# Patient Record
Sex: FEMALE | Race: WHITE | URBAN - METROPOLITAN AREA
[De-identification: names, ages, dates, MRNs, and addresses within clinical notes are randomized per-mention and may not be internally consistent; named-entity substitution may affect disease eponyms.]

---

## 2017-11-03 ENCOUNTER — TRANSFERRED RECORDS (OUTPATIENT)
Dept: HEALTH INFORMATION MANAGEMENT | Facility: CLINIC | Age: 27
End: 2017-11-03

## 2018-04-16 ENCOUNTER — CARE COORDINATION (OUTPATIENT)
Dept: CARDIOLOGY | Facility: CLINIC | Age: 28
End: 2018-04-16

## 2018-04-16 NOTE — PROGRESS NOTES
LM for patient to call back to request that she brings CD of all imaging done and bring to clinic visit with Dr. Jovanna Jolley.

## 2018-04-17 ENCOUNTER — CARE COORDINATION (OUTPATIENT)
Dept: CARDIOLOGY | Facility: CLINIC | Age: 28
End: 2018-04-17

## 2018-04-17 NOTE — PROGRESS NOTES
Patient called in stating they are in [place] right now. They asked regions to send over all imaging needed for appointment on 4/23 at the [facility]. They were also asking if care everywhere could be accessed for any additional imagining needed. They are tied up since they will be traveling the next couple days.

## 2018-04-23 ENCOUNTER — OFFICE VISIT (OUTPATIENT)
Dept: CARDIOLOGY | Facility: CLINIC | Age: 28
End: 2018-04-23
Attending: INTERNAL MEDICINE
Payer: COMMERCIAL

## 2018-04-23 VITALS
BODY MASS INDEX: 21.47 KG/M2 | HEART RATE: 107 BPM | HEIGHT: 70 IN | WEIGHT: 150 LBS | DIASTOLIC BLOOD PRESSURE: 85 MMHG | OXYGEN SATURATION: 99 % | SYSTOLIC BLOOD PRESSURE: 117 MMHG

## 2018-04-23 DIAGNOSIS — G90.A POTS (POSTURAL ORTHOSTATIC TACHYCARDIA SYNDROME): Primary | ICD-10-CM

## 2018-04-23 DIAGNOSIS — Z01.30 BLOOD PRESSURE CHECK: ICD-10-CM

## 2018-04-23 DIAGNOSIS — G43.009 MIGRAINE WITHOUT AURA AND WITHOUT STATUS MIGRAINOSUS, NOT INTRACTABLE: ICD-10-CM

## 2018-04-23 PROCEDURE — 93005 ELECTROCARDIOGRAM TRACING: CPT | Mod: ZF

## 2018-04-23 PROCEDURE — 99204 OFFICE O/P NEW MOD 45 MIN: CPT | Mod: ZP | Performed by: INTERNAL MEDICINE

## 2018-04-23 PROCEDURE — 93010 ELECTROCARDIOGRAM REPORT: CPT | Mod: ZP | Performed by: INTERNAL MEDICINE

## 2018-04-23 PROCEDURE — G0463 HOSPITAL OUTPT CLINIC VISIT: HCPCS | Mod: 25,ZF

## 2018-04-23 RX ORDER — PROPRANOLOL HYDROCHLORIDE 10 MG/1
TABLET ORAL
COMMUNITY
Start: 2017-10-05 | End: 2018-06-26 | Stop reason: DRUGHIGH

## 2018-04-23 RX ORDER — IBUPROFEN 400 MG/1
400 TABLET, FILM COATED ORAL
COMMUNITY

## 2018-04-23 RX ORDER — CLONIDINE HYDROCHLORIDE 0.2 MG/1
0.2 TABLET ORAL
COMMUNITY
Start: 2017-02-03

## 2018-04-23 RX ORDER — CLONAZEPAM 0.5 MG/1
0.5 TABLET ORAL
COMMUNITY
Start: 2017-11-08

## 2018-04-23 RX ORDER — CYANOCOBALAMIN (VITAMIN B-12) 2000 MCG
2500 TABLET ORAL
COMMUNITY

## 2018-04-23 RX ORDER — TRAZODONE HYDROCHLORIDE 100 MG/1
100 TABLET ORAL
COMMUNITY
Start: 2017-11-08

## 2018-04-23 RX ORDER — SERTRALINE HYDROCHLORIDE 25 MG/1
25 TABLET, FILM COATED ORAL
COMMUNITY
Start: 2018-03-13 | End: 2019-03-13

## 2018-04-23 RX ORDER — RIBOFLAVIN (VITAMIN B2) 100 MG
TABLET ORAL
COMMUNITY
Start: 2017-11-03

## 2018-04-23 ASSESSMENT — PAIN SCALES - GENERAL: PAINLEVEL: NO PAIN (0)

## 2018-04-23 NOTE — PATIENT INSTRUCTIONS
"You were seen today in the Cardiovascular Clinic at the [facility].     Cardiology Providers you saw during your visit: Dr. Jovanna Jolley    Diagnosis: Autonomic dysfunction    Results: discussed with patient    Orders: none    Medication Changes:   Can increase propranolol slowly to 10/20/10 every day if blood pressure is stable    Recommendations:   Stand up training against wall as directed  Aggressive hydration    Follow-up:  3 months         Please feel free to call me with any questions or concerns.       Anastacio Wagner LPN     Questions and [phone].   First press #1 for the [employer] and then press #3 for \"Medical Questions\" to reach us Cardiology Nurses.      On Call Cardiologist for after hours or on weekends: [phone]   option #4 and ask to speak to the on-call Cardiologist.          If you need a medication refill please contact your pharmacy.  Please allow 3 business days for your refill to be completed.        "
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
## 2018-04-23 NOTE — MR AVS SNAPSHOT
"              After Visit Summary   2018    Candy Duong    MRN: [id number]           Patient Information     Date Of Birth          1990        Visit Information        Provider Department      2018 10:00 AM Jovanna Jolley MD [facility]        Today's Diagnoses     Syncope    -  1    Blood pressure check        Migraine          Care Instructions    You were seen today in the Cardiovascular Clinic at the [facility].     Cardiology Providers you saw during your visit: Dr. Jovanna Jolley    Diagnosis: Autonomic dysfunction    Results: discussed with patient    Orders: none    Medication Changes:   Can increase propranolol slowly to 10/20/10 every day if blood pressure is stable    Recommendations:   Stand up training against wall as directed  Aggressive hydration    Follow-up:  3 months         Please feel free to call me with any questions or concerns.       Anastacio Wagner LPN     Questions and [phone].   First press #1 for the [employer] and then press #3 for \"Medical Questions\" to reach us Cardiology Nurses.      On Call Cardiologist for after hours or on weekends: [phone]   option #4 and ask to speak to the on-call Cardiologist.          If you need a medication refill please contact your pharmacy.  Please allow 3 business days for your refill to be completed.                Follow-ups after your visit        Additional Services     Neurology Referral       Your provider has referred you for the following:   Consult at PREFERRED PROVIDERS: Patient preference of choice    Please be aware that coverage of these services is subject to the terms and limitations of your health insurance plan.  Call member services at your health plan with any benefit or coverage questions.      Please bring the following with you to your appointment:    (1) Any X-Rays, CTs or MRIs which have been performed.  Contact the facility where they were done to arrange for  " "prior to your scheduled appointment.    (2) List of current medications  (3) This referral request   (4) Any documents/labs given to you for this referral            Follow-Up with Electrophysiologist - 3 Months                 Your next 10 appointments already scheduled     Jul 23, 2018 12:00 PM CDT   (Arrive by 11:45 AM)   RETURN ARRHYTHMIA with Jovanna Jolley MD   [facility]    [address] [place] [address]  [address] [place] [id number] [phone]              Future tests that were ordered for you today     Open Future Orders        Priority Expected Expires Ordered    Neurology Referral Routine 4/30/2018 7/29/2018 4/23/2018    Follow-Up with Electrophysiologist - 3 Months Routine 7/22/2018 10/20/2018 4/23/2018            Who to contact     If you have questions or need follow up information about today's clinic visit or your schedule please contact [facility] directly at [phone].  Normal or non-critical lab and imaging results will be communicated to you by [employer] mobilehart, letter or phone within 4 business days after the clinic has received the results. If you do not hear from us within 7 days, please contact the clinic through [employer] mobilehart or phone. If you have a critical or abnormal lab result, we will notify you by phone as soon as possible.  Submit refill requests through [employer] or call your pharmacy and they will forward the refill request to us. Please allow 3 business days for your refill to be completed.          Additional Information About Your Visit        [employer] Information     [employer] lets you send messages to your doctor, view your test results, renew your prescriptions, schedule appointments and more. To sign up, go to [email or web address] . Click on \"Log in\" on the left side of the screen, which will take you to the Welcome page. Then click on \"Sign up Now\" on the right side of the page.     You will be asked to enter the access code listed " "below, as well as some personal information. Please follow the directions to create your username and password.     Your access code is: [email or web address]  Expires: 2018 11:30 AM     Your access code will  in 90 days. If you need help or a new code, please call your [facility] clinic or [phone].        Care EveryWhere ID     This is your Care EveryWhere ID. This could be used by other organizations to access your [facility] medical records  [id number]        Your Vitals Were     Pulse Height Pulse Oximetry BMI (Body Mass Index)          107 1.778 m (5' 10\") 99% 21.52 kg/m2         Blood Pressure from Last 3 Encounters:   18 117/85    Weight from Last 3 Encounters:   18 68 kg (150 lb)              We Performed the Following     EKG 12-lead, tracing only (Same Day)     MISCELLANEOUS DME SUPPLY OR ACCESSORY, NOT OTHERWISE SPECIFIED        Primary Care Provider Office Phone # Fax #    Cassia Delgadillo [phone]       [place] [address]        Equal Access to Services     [facility] HospitalLEIGH : Hadii aad ku hadasho Soomaali, waaxda luqadaha, qaybta kaalmada adeegyada, toan tan . So [facility] [phone].    ATENCIÓN: Si habla español, tiene a thomson disposición servicios gratuitos de asistencia lingüística. Llame al [phone].    We comply with applicable federal civil rights laws and [place] laws. We do not discriminate on the basis of race, color, national origin, age, disability, sex, sexual orientation, or gender identity.            Thank you!     Thank you for choosing [facility]  for your care. Our goal is always to provide you with excellent care. Hearing back from our patients is one way we can continue to improve our services. Please take a few minutes to complete the written survey that you may receive in the mail after your visit with us. Thank you!             Your Updated Medication List - Protect " others around you: Learn how to safely use, store and throw away your medicines at www.disposemymeds.org.          This list is accurate as of 4/23/18 11:10 AM.  Always use your most recent med list.                   Brand Name Dispense Instructions for use Diagnosis    B-12 2000 MCG Tabs      Take 2,500 mcg by mouth        clonazePAM 0.5 MG tablet    klonoPIN     Take 0.5 mg by mouth        cloNIDine 0.2 MG tablet    CATAPRES     Take 0.2 mg by mouth        ibuprofen 400 MG tablet    ADVIL/MOTRIN     Take 400 mg by mouth        propranolol 10 MG tablet    INDERAL     TAKE 1 TABLET BY MOUTH THREE TIMES DAILY        riboflavin 100 MG Tabs tablet    vitamin  B-2     Take 2 pill twice a day        sertraline 25 MG tablet    ZOLOFT     Take 25 mg by mouth        traZODone 100 MG tablet    DESYREL     Take 100 mg by mouth

## 2018-04-23 NOTE — NURSING NOTE
Return Appointment: 3 months- Patient given instructions regarding scheduling next clinic visit. Patient demonstrated understanding of this information and agreed to call with further questions or concerns.  Medication Change: Can increase propranolol slowly to 10/20/10 every day if blood pressure is stable. Patient was educated regarding prescribed medication change, including discussion of the indication, administration, side effects, and when to report to MD or RN. Patient demonstrated understanding of this information and agreed to call with further questions or concerns.  Patient stated she understood all health information given and agreed to call with further questions or concerns.

## 2018-04-23 NOTE — NURSING NOTE
Chief Complaint   Patient presents with     Follow Up For     27 yo female present for syncope      Vitals were taken and medications were reconciled. EKG was performed.Orthostatic BP check   Whit Soriano,YONYA  9:51 AM

## 2018-04-25 LAB — INTERPRETATION ECG - MUSE: NORMAL

## 2018-05-13 ASSESSMENT — ENCOUNTER SYMPTOMS
TREMORS: 1
FATIGUE: 1
HYPOTENSION: 1
SMELL DISTURBANCE: 0
SINUS CONGESTION: 1
BLOATING: 0
SNORES LOUDLY: 0
SHORTNESS OF BREATH: 1
EXERCISE INTOLERANCE: 1
POLYDIPSIA: 0
VOMITING: 0
NIGHT SWEATS: 1
HEARTBURN: 0
DECREASED APPETITE: 1
LIGHT-HEADEDNESS: 1
BLOOD IN STOOL: 0
EYE IRRITATION: 0
SWOLLEN GLANDS: 0
HEMOPTYSIS: 0
HALLUCINATIONS: 0
BOWEL INCONTINENCE: 0
COUGH DISTURBING SLEEP: 0
SYNCOPE: 1
DEPRESSION: 1
LEG PAIN: 0
CHILLS: 1
DISTURBANCES IN COORDINATION: 0
POLYPHAGIA: 0
ORTHOPNEA: 0
TROUBLE SWALLOWING: 0
BRUISES/BLEEDS EASILY: 1
WEIGHT LOSS: 0
RECTAL PAIN: 0
EYE PAIN: 0
INCREASED ENERGY: 0
EYE REDNESS: 0
SORE THROAT: 0
ABDOMINAL PAIN: 0
NECK MASS: 0
NAUSEA: 0
SLEEP DISTURBANCES DUE TO BREATHING: 0
CONSTIPATION: 0
FEVER: 0
HOARSE VOICE: 0
POSTURAL DYSPNEA: 0
TASTE DISTURBANCE: 0
PARALYSIS: 0
LOSS OF CONSCIOUSNESS: 1
DECREASED CONCENTRATION: 0
DIARRHEA: 1
TINGLING: 1
JAUNDICE: 0
MEMORY LOSS: 1
WHEEZING: 0
PANIC: 1
DIZZINESS: 1
PALPITATIONS: 1
SEIZURES: 0
SPEECH CHANGE: 1
SINUS PAIN: 0
DYSPNEA ON EXERTION: 1
NUMBNESS: 0
BREAST PAIN: 1
INSOMNIA: 1
WEIGHT GAIN: 0
HYPERTENSION: 0
DOUBLE VISION: 1
HEADACHES: 1
BREAST MASS: 1
COUGH: 0
SPUTUM PRODUCTION: 0
WEAKNESS: 1
EYE WATERING: 1
ALTERED TEMPERATURE REGULATION: 0
NERVOUS/ANXIOUS: 1

## 2018-05-14 NOTE — TELEPHONE ENCOUNTER
FUTURE VISIT INFORMATION      FUTURE VISIT INFORMATION:    Date: 05/17/2018    Time:     Location:   REFERRAL INFORMATION:    Referring provider:  YESSENIA REYNOSO    Referring providers clinic:      Reason for visit/diagnosis        RECORDS STATUS    Internal Records:    Epic Chart and Care Everywhere, Images Epic chart and Care Everywhere, Additional Records in Scanned Images on the Encounters Tab.

## 2018-05-15 ENCOUNTER — HEALTH MAINTENANCE LETTER (OUTPATIENT)
Age: 28
End: 2018-05-15

## 2018-05-17 ENCOUNTER — OFFICE VISIT (OUTPATIENT)
Dept: NEUROLOGY | Facility: CLINIC | Age: 28
End: 2018-05-17
Payer: COMMERCIAL

## 2018-05-17 ENCOUNTER — PRE VISIT (OUTPATIENT)
Dept: NEUROLOGY | Facility: CLINIC | Age: 28
End: 2018-05-17

## 2018-05-17 VITALS
SYSTOLIC BLOOD PRESSURE: 112 MMHG | WEIGHT: 145.4 LBS | BODY MASS INDEX: 20.81 KG/M2 | HEART RATE: 82 BPM | HEIGHT: 70 IN | DIASTOLIC BLOOD PRESSURE: 79 MMHG

## 2018-05-17 DIAGNOSIS — R41.89 COGNITIVE AND BEHAVIORAL CHANGES: ICD-10-CM

## 2018-05-17 DIAGNOSIS — S06.9X9S TRAUMATIC BRAIN INJURY WITH LOSS OF CONSCIOUSNESS, SEQUELA (H): ICD-10-CM

## 2018-05-17 DIAGNOSIS — R46.89 COGNITIVE AND BEHAVIORAL CHANGES: ICD-10-CM

## 2018-05-17 DIAGNOSIS — G89.29 CHRONIC INTRACTABLE HEADACHE, UNSPECIFIED HEADACHE TYPE: Primary | ICD-10-CM

## 2018-05-17 DIAGNOSIS — G43.009 MIGRAINE WITHOUT AURA AND WITHOUT STATUS MIGRAINOSUS, NOT INTRACTABLE: ICD-10-CM

## 2018-05-17 DIAGNOSIS — R51.9 CHRONIC INTRACTABLE HEADACHE, UNSPECIFIED HEADACHE TYPE: Primary | ICD-10-CM

## 2018-05-17 RX ORDER — LORATADINE 10 MG/1
10 TABLET ORAL DAILY
COMMUNITY

## 2018-05-17 ASSESSMENT — PAIN SCALES - GENERAL: PAINLEVEL: NO PAIN (0)
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
## 2018-05-17 NOTE — MR AVS SNAPSHOT
After Visit Summary   5/17/2018    Candy Duong    MRN: [id number]           Patient Information     Date Of Birth          1990        Visit Information        Provider Department      5/17/2018 5:00 PM Mercedez Alvarez MD [facility] Neurology        Today's Diagnoses     Chronic intractable headache, unspecified headache type    -  1    Migraine without aura and without status migrainosus, not intractable        Traumatic brain injury with loss of consciousness, sequela (H)        Cognitive and behavioral changes           Follow-ups after your visit        Additional Services     NEUROPSYCHOLOGY REFERRAL       Your provider has referred you to:    Dr Zahra Brock [place]    All scheduling is subject to the client's specific insurance plan & benefits, provider/location availability, and provider clinical specialities.  Please arrive 15 minutes early for your first appointment and bring your completed paperwork.    Please be aware that coverage of these services is subject to the terms and limitations of your health insurance plan.  Call member services at your health plan with any benefit or coverage questions.    Please bring the following to your appointment:  >>   Any x-rays, CTs or MRIs which have been performed.  Contact the facility where they were done to arrange for  prior to your scheduled appointment.  Any new CT, MRI or other procedures ordered by your specialist must be performed at a [facility] facility or coordinated by your clinic's referral office.    >>   List of current medications   >>   This referral request   >>   Any documents/labs given to you for this referral                  Follow-up notes from your care team     Return for Routine Visit.      Your next 10 appointments already scheduled     May 30, 2018 12:30 PM CDT   MR VENOGRAM BRAIN W/O CONTRAST ANGIOGRAM with URMR2   [facility], MRI ([facility]  [place])    [address] [facility] [address] [phone]           Take your medicines as usual, unless your doctor tells you not to. Bring a list of your current medicines to your exam (including vitamins, minerals and over-the-counter drugs). Also bring the results of similar scans you may have had.  Please remove any body piercings and hair extensions before you arrive.  Follow your doctor s orders. If you do not, we may have to postpone your exam.  You may or may not receive IV contrast for this exam pending the discretion of the Radiologist.  You do not need to do anything special to prepare.  The MRI machine uses a strong magnet. Please wear clothes without metal (snaps, zippers). A sweatsuit works well, or we may give you a hospital gown.   **IMPORTANT** THE INSTRUCTIONS BELOW ARE ONLY FOR THOSE PATIENTS WHO HAVE BEEN PRESCRIBED SEDATION OR GENERAL ANESTHESIA DURING THEIR MRI PROCEDURE:  IF YOUR DOCTOR PRESCRIBED ORAL SEDATION (take medicine to help you relax during your exam):   You must get the medicine from your doctor (oral medication) before you arrive. Bring the medicine to the exam. Do not take it at home. You ll be told when to take it upon arriving for your exam.   Arrive one hour early. Bring someone who can take you home after the test. Your medicine will make you sleepy. After the exam, you may not drive, take a bus or take a taxi by yourself.  IF YOUR DOCTOR PRESCRIBED IV SEDATION:   Arrive one hour early. Bring someone who can take you home after the test. Your medicine will make you sleepy. After the exam, you may not drive, take a bus or take a taxi by yourself.   No eating 6 hours before your exam. You may have clear liquids up until 4 hours before your exam. (Clear liquids include water, clear tea, black coffee and fruit juice without pulp.)  IF YOUR DOCTOR PRESCRIBED ANESTHESIA (be asleep for your exam):   Arrive 1 1/2 hours early. Bring someone who can take you home after  the test. You may not drive, take a bus or take a taxi by yourself.   No eating 8 hours before your exam. You may have clear liquids up until 4 hours before your exam. (Clear liquids include water, clear tea, black coffee and fruit juice without pulp.)   You will spend four to five hours in the recovery room.  Please call the Imaging Department at your exam site with any questions.            May 30, 2018 12:45 PM CDT   MR NECK W CONTRAST ANGIOGRAM with URMR2   [facility] [place], MRI ([facility])    [facility] [facility] [address] [phone]           Take your medicines as usual, unless your doctor tells you not to. Bring a list of your current medicines to your exam (including vitamins, minerals and over-the-counter drugs).  You may or may not receive intravenous (IV) contrast for this exam pending the discretion of the Radiologist.  You do not need to do anything special to prepare.  The MRI machine uses a strong magnet. Please wear clothes without metal (snaps, zippers). A sweatsuit works well, or we may give you a hospital gown.  Please remove any body piercings and hair extensions before you arrive. You will also remove watches, jewelry, hairpins, wallets, dentures, partial dental plates and hearing aids. You may wear contact lenses, and you may be able to wear your rings. We have a safe place to keep your personal items, but it is safer to leave them at home.  **IMPORTANT** THE INSTRUCTIONS BELOW ARE ONLY FOR THOSE PATIENTS WHO HAVE BEEN PRESCRIBED SEDATION OR GENERAL ANESTHESIA DURING THEIR MRI PROCEDURE:  IF YOUR DOCTOR PRESCRIBED ORAL SEDATION (take medicine to help you relax during your exam):   You must get the medicine from your doctor (oral medication) before you arrive. Bring the medicine to the exam. Do not take it at home. You ll be told when to take it upon arriving for your exam.   Arrive one hour early. Bring someone who can take you  home after the test. Your medicine will make you sleepy. After the exam, you may not drive, take a bus or take a taxi by yourself.  IF YOUR DOCTOR PRESCRIBED IV SEDATION:   Arrive one hour early. Bring someone who can take you home after the test. Your medicine will make you sleepy. After the exam, you may not drive, take a bus or take a taxi by yourself.   No eating 6 hours before your exam. You may have clear liquids up until 4 hours before your exam. (Clear liquids include water, clear tea, black coffee and fruit juice without pulp.)  IF YOUR DOCTOR PRESCRIBED ANESTHESIA (be asleep for your exam):   Arrive 1 1/2 hours early. Bring someone who can take you home after the test. You may not drive, take a bus or take a taxi by yourself.   No eating 8 hours before your exam. You may have clear liquids up until 4 hours before your exam. (Clear liquids include water, clear tea, black coffee and fruit juice without pulp.)   You will spend four to five hours in the recovery room.  Please call the Imaging Department at your exam site with any questions.            May 30, 2018  1:15 PM CDT   MR HEAD W/O & W CONTRAST ANGIOGRAM with URMR2   [facility] [place], [facility] ([facility])    [address] [place] [address] [phone]           Take your medicines as usual, unless your doctor tells you not to. Bring a list of your current medicines to your exam (including vitamins, minerals and over-the-counter drugs).  You may or may not receive intravenous (IV) contrast for this exam pending the discretion of the Radiologist.  You do not need to do anything special to prepare.  The MRI machine uses a strong magnet. Please wear clothes without metal (snaps, zippers). A sweatsuit works well, or we may give you a hospital gown.  Please remove any body piercings and hair extensions before you arrive. You will also remove watches, jewelry, hairpins, wallets, dentures, partial  dental plates and hearing aids. You may wear contact lenses, and you may be able to wear your rings. We have a safe place to keep your personal items, but it is safer to leave them at home.  **IMPORTANT** THE INSTRUCTIONS BELOW ARE ONLY FOR THOSE PATIENTS WHO HAVE BEEN PRESCRIBED SEDATION OR GENERAL ANESTHESIA DURING THEIR MRI PROCEDURE:  IF YOUR DOCTOR PRESCRIBED ORAL SEDATION (take medicine to help you relax during your exam):   You must get the medicine from your doctor (oral medication) before you arrive. Bring the medicine to the exam. Do not take it at home. You ll be told when to take it upon arriving for your exam.   Arrive one hour early. Bring someone who can take you home after the test. Your medicine will make you sleepy. After the exam, you may not drive, take a bus or take a taxi by yourself.  IF YOUR DOCTOR PRESCRIBED IV SEDATION:   Arrive one hour early. Bring someone who can take you home after the test. Your medicine will make you sleepy. After the exam, you may not drive, take a bus or take a taxi by yourself.   No eating 6 hours before your exam. You may have clear liquids up until 4 hours before your exam. (Clear liquids include water, clear tea, black coffee and fruit juice without pulp.)  IF YOUR DOCTOR PRESCRIBED ANESTHESIA (be asleep for your exam):   Arrive 1 1/2 hours early. Bring someone who can take you home after the test. You may not drive, take a bus or take a taxi by yourself.   No eating 8 hours before your exam. You may have clear liquids up until 4 hours before your exam. (Clear liquids include water, clear tea, black coffee and fruit juice without pulp.)   You will spend four to five hours in the recovery room.  Please call the Imaging Department at your exam site with any questions.            May 30, 2018  1:45 PM CDT   MR BRAIN W/O & W CONTRAST with URMR2   [facility] [place], MRI ([facility])    [facility] [facility]  [address]   [phone]           Take your medicines as usual, unless your doctor tells you not to. Bring a list of your current medicines to your exam (including vitamins, minerals and over-the-counter drugs).  You may or may not receive intravenous (IV) contrast for this exam pending the discretion of the Radiologist.  You do not need to do anything special to prepare.  The MRI machine uses a strong magnet. Please wear clothes without metal (snaps, zippers). A sweatsuit works well, or we may give you a hospital gown.  Please remove any body piercings and hair extensions before you arrive. You will also remove watches, jewelry, hairpins, wallets, dentures, partial dental plates and hearing aids. You may wear contact lenses, and you may be able to wear your rings. We have a safe place to keep your personal items, but it is safer to leave them at home.  **IMPORTANT** THE INSTRUCTIONS BELOW ARE ONLY FOR THOSE PATIENTS WHO HAVE BEEN PRESCRIBED SEDATION OR GENERAL ANESTHESIA DURING THEIR MRI PROCEDURE:  IF YOUR DOCTOR PRESCRIBED ORAL SEDATION (take medicine to help you relax during your exam):   You must get the medicine from your doctor (oral medication) before you arrive. Bring the medicine to the exam. Do not take it at home. You ll be told when to take it upon arriving for your exam.   Arrive one hour early. Bring someone who can take you home after the test. Your medicine will make you sleepy. After the exam, you may not drive, take a bus or take a taxi by yourself.  IF YOUR DOCTOR PRESCRIBED IV SEDATION:   Arrive one hour early. Bring someone who can take you home after the test. Your medicine will make you sleepy. After the exam, you may not drive, take a bus or take a taxi by yourself.   No eating 6 hours before your exam. You may have clear liquids up until 4 hours before your exam. (Clear liquids include water, clear tea, black coffee and fruit juice without pulp.)  IF YOUR DOCTOR PRESCRIBED ANESTHESIA (be  asleep for your exam):   Arrive 1 1/2 hours early. Bring someone who can take you home after the test. You may not drive, take a bus or take a taxi by yourself.   No eating 8 hours before your exam. You may have clear liquids up until 4 hours before your exam. (Clear liquids include water, clear tea, black coffee and fruit juice without pulp.)   You will spend four to five hours in the recovery room.  Please call the Imaging Department at your exam site with any questions.            Jun 07, 2018  4:00 PM CDT   (Arrive by 3:45 PM)   Return Visit with Mercedez Alvarez MD   [facility] Neurology ([facility])    [address]  [address]  [facility] [address] [phone]            Jul 23, 2018 12:00 PM CDT   (Arrive by 11:45 AM)   RETURN ARRHYTHMIA with Jovanna Jolley MD   [facility] Heart Care ([facility])    [address]  [address] [place] [address] [phone]              Future tests that were ordered for you today     Open Future Orders        Priority Expected Expires Ordered    MRI Brain w & w/o contrast Routine  5/17/2019 5/17/2018    MRI Angiogram head w & w/o contrast Routine  5/17/2019 5/17/2018    MRI Angiogram neck w contrast Routine  5/17/2019 5/17/2018    MRA Brain Venogram w/o Contrast Routine  5/17/2019 5/17/2018            Who to contact     Please call your clinic at [phone] to:    Ask questions about your health    Make or cancel appointments    Discuss your medicines    Learn about your test results    Speak to your doctor            Additional Information About Your Visit        MyChart Information     [employer] gives you secure access to your electronic health record. If you see a primary care provider, you can also send messages to your care team and make appointments. If you have questions, please call your primary care clinic.  If you do not have a primary care provider, please call [phone] and they  "will assist you.      [employer] is an electronic gateway that provides easy, online access to your medical records. With [employer], you can request a clinic appointment, read your test results, renew a prescription or communicate with your care team.     To access your existing account, please contact your [facility] or call [phone] for assistance.        Care EveryWhere ID     This is your Care EveryWhere ID. This could be used by other organizations to access your [facility] [place] medical records  [id number]        Your Vitals Were     Pulse Height BMI (Body Mass Index)             82 1.778 m (5' 10\") 20.86 kg/m2          Blood Pressure from Last 3 Encounters:   05/17/18 112/79   04/23/18 117/85    Weight from Last 3 Encounters:   05/17/18 66 kg (145 lb 6.4 oz)   04/23/18 68 kg (150 lb)              We Performed the Following     Neurology Referral     NEUROPSYCHOLOGY REFERRAL        Primary Care Provider Office Phone # Fax #    Cassia Delgadillo [phone]       [place] [address]        Equal Access to Services     PASTOR PAN : Hadii aad ku hadasho Soomaali, waaxda luqadaha, qaybta kaalmada adeegyada, toan tan . So [facility] [phone].    ATENCIÓN: Si habla español, tiene a thomson disposición servicios gratuitos de asistencia lingüística. Mario al [phone].    We comply with applicable federal civil rights laws and [place] laws. We do not discriminate on the basis of race, color, national origin, age, disability, sex, sexual orientation, or gender identity.            Thank you!     Thank you for choosing [facility] NEUROLOGY  for your care. Our goal is always to provide you with excellent care. Hearing back from our patients is one way we can continue to improve our services. Please take a few minutes to complete the written survey that you may receive in the mail after your visit with us. Thank " you!             Your Updated Medication List - Protect others around you: Learn how to safely use, store and throw away your medicines at www.disposemymeds.org.          This list is accurate as of 5/17/18  6:21 PM.  Always use your most recent med list.                   Brand Name Dispense Instructions for use Diagnosis    B-12 2000 MCG Tabs      Take 2,500 mcg by mouth        clonazePAM 0.5 MG tablet    klonoPIN     Take 0.5 mg by mouth        cloNIDine 0.2 MG tablet    CATAPRES     Take 0.2 mg by mouth        ibuprofen 400 MG tablet    ADVIL/MOTRIN     Take 400 mg by mouth        loratadine 10 MG tablet    CLARITIN     Take 10 mg by mouth daily        propranolol 10 MG tablet    INDERAL     TAKE 1 TABLET BY MOUTH THREE TIMES DAILY        riboflavin 100 MG Tabs tablet    vitamin  B-2     Take 2 pill twice a day        sertraline 25 MG tablet    ZOLOFT     Take 25 mg by mouth        traZODone 100 MG tablet    DESYREL     Take 100 mg by mouth

## 2018-05-17 NOTE — NURSING NOTE
Chief Complaint   Patient presents with     Consult     [facility] NEW - CONSULT     Manasa Mcintyre MA

## 2018-05-18 ENCOUNTER — CARE COORDINATION (OUTPATIENT)
Dept: NEUROLOGY | Facility: CLINIC | Age: 28
End: 2018-05-18

## 2018-05-30 ENCOUNTER — HOSPITAL ENCOUNTER (OUTPATIENT)
Dept: MRI IMAGING | Facility: CLINIC | Age: 28
End: 2018-05-30
Attending: PSYCHIATRY & NEUROLOGY
Payer: COMMERCIAL

## 2018-05-30 ENCOUNTER — HOSPITAL ENCOUNTER (OUTPATIENT)
Dept: MRI IMAGING | Facility: CLINIC | Age: 28
Discharge: HOME | End: 2018-05-30
Attending: PSYCHIATRY & NEUROLOGY | Admitting: PSYCHIATRY & NEUROLOGY
Payer: COMMERCIAL

## 2018-05-30 DIAGNOSIS — G89.29 CHRONIC INTRACTABLE HEADACHE, UNSPECIFIED HEADACHE TYPE: ICD-10-CM

## 2018-05-30 DIAGNOSIS — R51.9 CHRONIC INTRACTABLE HEADACHE, UNSPECIFIED HEADACHE TYPE: ICD-10-CM

## 2018-05-30 DIAGNOSIS — G43.009 MIGRAINE WITHOUT AURA AND WITHOUT STATUS MIGRAINOSUS, NOT INTRACTABLE: ICD-10-CM

## 2018-05-30 PROCEDURE — 25000128 H RX IP 250 OP 636: Performed by: PSYCHIATRY & NEUROLOGY

## 2018-05-30 PROCEDURE — 70546 MR ANGIOGRAPH HEAD W/O&W/DYE: CPT

## 2018-05-30 PROCEDURE — 70544 MR ANGIOGRAPHY HEAD W/O DYE: CPT | Mod: XS

## 2018-05-30 PROCEDURE — 70553 MRI BRAIN STEM W/O & W/DYE: CPT

## 2018-05-30 PROCEDURE — A9585 GADOBUTROL INJECTION: HCPCS | Performed by: PSYCHIATRY & NEUROLOGY

## 2018-05-30 PROCEDURE — 70549 MR ANGIOGRAPH NECK W/O&W/DYE: CPT

## 2018-05-30 RX ORDER — GADOBUTROL 604.72 MG/ML
7.5 INJECTION INTRAVENOUS ONCE
Status: COMPLETED | OUTPATIENT
Start: 2018-05-30 | End: 2018-05-30

## 2018-05-30 RX ADMIN — SODIUM CHLORIDE 40 ML: 9 INJECTION, SOLUTION INTRAVENOUS at 13:36

## 2018-05-30 RX ADMIN — GADOBUTROL 6.6 ML: 604.72 INJECTION INTRAVENOUS at 13:36

## 2018-06-01 ASSESSMENT — ENCOUNTER SYMPTOMS
SYNCOPE: 1
NUMBNESS: 0
SLEEP DISTURBANCES DUE TO BREATHING: 0
DEPRESSION: 1
LOSS OF CONSCIOUSNESS: 1
HYPOTENSION: 1
DIZZINESS: 1
NERVOUS/ANXIOUS: 1
DISTURBANCES IN COORDINATION: 0
SPUTUM PRODUCTION: 0
WHEEZING: 0
ORTHOPNEA: 0
PALPITATIONS: 1
COUGH DISTURBING SLEEP: 0
SNORES LOUDLY: 0
MEMORY LOSS: 0
LIGHT-HEADEDNESS: 1
PANIC: 1
SPEECH CHANGE: 1
LEG PAIN: 0
HYPERTENSION: 0
COUGH: 0
SHORTNESS OF BREATH: 1
EXERCISE INTOLERANCE: 1
DYSPNEA ON EXERTION: 1
DECREASED CONCENTRATION: 0
WEAKNESS: 0
PARALYSIS: 0
TREMORS: 1
TINGLING: 0
INSOMNIA: 0
POSTURAL DYSPNEA: 0
SEIZURES: 0
HEMOPTYSIS: 0
HEADACHES: 1

## 2018-06-07 ENCOUNTER — OFFICE VISIT (OUTPATIENT)
Dept: NEUROLOGY | Facility: CLINIC | Age: 28
End: 2018-06-07
Payer: COMMERCIAL

## 2018-06-07 VITALS
OXYGEN SATURATION: 100 % | WEIGHT: 140.6 LBS | TEMPERATURE: 98.2 F | SYSTOLIC BLOOD PRESSURE: 110 MMHG | RESPIRATION RATE: 24 BRPM | BODY MASS INDEX: 20.13 KG/M2 | HEIGHT: 70 IN | HEART RATE: 83 BPM | DIASTOLIC BLOOD PRESSURE: 74 MMHG

## 2018-06-07 DIAGNOSIS — G90.A POTS (POSTURAL ORTHOSTATIC TACHYCARDIA SYNDROME): ICD-10-CM

## 2018-06-07 DIAGNOSIS — S06.9X9S TRAUMATIC BRAIN INJURY WITH LOSS OF CONSCIOUSNESS, SEQUELA (H): Primary | ICD-10-CM

## 2018-06-07 PROBLEM — F60.3 BORDERLINE PERSONALITY DISORDER (H): Status: ACTIVE | Noted: 2017-08-24

## 2018-06-07 PROBLEM — M54.81 OCCIPITAL NEURALGIA: Status: ACTIVE | Noted: 2018-06-07

## 2018-06-07 PROBLEM — F43.10 PTSD (POST-TRAUMATIC STRESS DISORDER): Status: ACTIVE | Noted: 2017-04-07

## 2018-06-07 PROBLEM — Z79.899 MEDICAL CANNABIS USE: Status: ACTIVE | Noted: 2017-11-08

## 2018-06-07 ASSESSMENT — PAIN SCALES - GENERAL: PAINLEVEL: MILD PAIN (3)

## 2018-06-07 NOTE — NURSING NOTE
Chief Complaint   Patient presents with     RECHECK     [facility]- HEADACHES/ MRI RESULTS REVIEW     Nasim Hinson, CMA

## 2018-06-07 NOTE — MR AVS SNAPSHOT
After Visit Summary   6/7/2018    Candy Duong    MRN: [id number]           Patient Information     Date Of Birth          1990        Visit Information        Provider Department      6/7/2018 4:00 PM Mercedez Alvarez MD [facility] Neurology        Today's Diagnoses     Traumatic brain injury with loss of consciousness, sequela (H)    -  1    POTS (postural orthostatic tachycardia syndrome)          Care Instructions    Rizatriptan 5 mg would be the mildest migraine abortive to try    Magnesium supplements can help migraines              Follow-ups after your visit        Additional Services     NEUROLOGY ADULT REFERRAL       Your provider has referred you for the following:   [place] [facility] [place]    Please be aware that coverage of these services is subject to the terms and limitations of your health insurance plan.  Call member services at your health plan with any benefit or coverage questions.      Please bring the following with you to your appointment:    (1) Any X-Rays, CTs or MRIs which have been performed.  Contact the facility where they were done to arrange for  prior to your scheduled appointment.    (2) List of current medications  (3) This referral request   (4) Any documents/labs given to you for this referral            NEUROPSYCHOLOGY REFERRAL       Your provider has referred you to:    [facility]: Adult Neuropsychology [facility] - [place] [phone] Preferred Provider: Carmel Murry Ph.D., , [facility]   [email or web address]    All scheduling is subject to the client's specific insurance plan & benefits, provider/location availability, and provider clinical specialities.  Please arrive 15 minutes early for your first appointment and bring your completed paperwork.    Please be aware that coverage of these services is subject to the terms and limitations of your health insurance plan.  Call member  services at your health plan with any benefit or coverage questions.    Please bring the following to your appointment:  >>   Any x-rays, CTs or MRIs which have been performed.  Contact the facility where they were done to arrange for  prior to your scheduled appointment.  Any new CT, MRI or other procedures ordered by your specialist must be performed at a [place] facility or coordinated by your clinic's referral office.    >>   List of current medications   >>   This referral request   >>   Any documents/labs given to you for this referral            PHYSIATRY REFERRAL       Your provider has referred you to: [facility] TBI clinic    Please note these locations do not prescribe narcotics or manage chronic pain.    Please be aware that coverage of these services is subject to the terms and limitations of your health insurance plan.  Call member services at your health plan with any benefit or coverage questions.      Please bring the following to your appointment:  >>   Any x-rays, CTs or MRIs which have been performed.  Contact the facility where they were done to arrange for  prior to your scheduled appointment.    >>   List of current medications   >>   This referral request   >>   Any documents/labs given to you for this referral                  Follow-up notes from your care team     Return if symptoms worsen or fail to improve.      Your next 10 appointments already scheduled     Jul 23, 2018 12:00 PM CDT   (Arrive by 11:45 AM)   RETURN ARRHYTHMIA with Jovanna Jolley MD   [facility] [place])    [address] [place] [address]  [address] [place] [id number] [phone]              Who to contact     Please call your clinic at [phone] to:    Ask questions about your health    Make or cancel appointments    Discuss your medicines    Learn about your test results    Speak to your doctor            Additional Information About Your Visit        MyChart Information   "   [employer] gives you secure access to your electronic health record. If you see a primary care provider, you can also send messages to your care team and make appointments. If you have questions, please call your primary care clinic.  If you do not have a primary care provider, please call [phone] and they will assist you.      [employer] is an electronic gateway that provides easy, online access to your medical records. With [employer], you can request a clinic appointment, read your test results, renew a prescription or communicate with your care team.     To access your existing account, please contact your [facility] or call [phone] for assistance.        Care EveryWhere ID     This is your Care EveryWhere ID. This could be used by other organizations to access your [facility] medical records  [id number]        Your Vitals Were     Pulse Temperature Respirations Height Pulse Oximetry Breastfeeding?    83 98.2  F (36.8  C) (Oral) 24 1.765 m (5' 9.5\") 100% No    BMI (Body Mass Index)                   20.47 kg/m2            Blood Pressure from Last 3 Encounters:   06/07/18 110/74   05/17/18 112/79   04/23/18 117/85    Weight from Last 3 Encounters:   06/07/18 63.8 kg (140 lb 9.6 oz)   05/17/18 66 kg (145 lb 6.4 oz)   04/23/18 68 kg (150 lb)              We Performed the Following     NEUROLOGY ADULT REFERRAL     NEUROPSYCHOLOGY REFERRAL     PHYSIATRY REFERRAL        Primary Care Provider Office Phone # Fax #    Cassia Delgadillo [phone]       [place] [address]        Equal Access to Services     [facility]: Hadii aad ku hadasho Soomaali, waaxda luqadaha, qaybta kaalmada adeegyada, toan falcon. So [facility] [phone].    ATENCIÓN: Si habla español, tiene a thomson disposición servicios gratuitos de asistencia lingüística. Llame al [phone].    We comply with applicable federal civil rights " laws and [place] laws. We do not discriminate on the basis of race, color, national origin, age, disability, sex, sexual orientation, or gender identity.            Thank you!     Thank you for choosing [facility] NEUROLOGY  for your care. Our goal is always to provide you with excellent care. Hearing back from our patients is one way we can continue to improve our services. Please take a few minutes to complete the written survey that you may receive in the mail after your visit with us. Thank you!             Your Updated Medication List - Protect others around you: Learn how to safely use, store and throw away your medicines at www.disposemymeds.org.          This list is accurate as of 6/7/18  5:31 PM.  Always use your most recent med list.                   Brand Name Dispense Instructions for use Diagnosis    B-12 2000 MCG Tabs      Take 2,500 mcg by mouth        clonazePAM 0.5 MG tablet    klonoPIN     Take 0.5 mg by mouth        cloNIDine 0.2 MG tablet    CATAPRES     Take 0.2 mg by mouth        ibuprofen 400 MG tablet    ADVIL/MOTRIN     Take 400 mg by mouth        loratadine 10 MG tablet    CLARITIN     Take 10 mg by mouth daily        propranolol 10 MG tablet    INDERAL     TAKE 1 TABLET BY MOUTH THREE TIMES DAILY        riboflavin 100 MG Tabs tablet    vitamin  B-2     Take 2 pill twice a day        sertraline 25 MG tablet    ZOLOFT     Take 25 mg by mouth        traZODone 100 MG tablet    DESYREL     Take 100 mg by mouth
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
## 2018-06-07 NOTE — PATIENT INSTRUCTIONS
Rizatriptan 5 mg would be the mildest migraine abortive to try    Magnesium supplements can help migraines

## 2018-06-08 ENCOUNTER — CARE COORDINATION (OUTPATIENT)
Dept: NEUROLOGY | Facility: CLINIC | Age: 28
End: 2018-06-08
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
## 2018-06-08 NOTE — PROGRESS NOTES
Sent out 2 referrals per Dr. Alvarez to [facility]. 1 to Dr. Gallardo and the other for Physiatry to fax # [phone] today at 1153 on 6/08/2018

## 2018-06-26 DIAGNOSIS — G90.A POTS (POSTURAL ORTHOSTATIC TACHYCARDIA SYNDROME): Primary | ICD-10-CM

## 2018-06-26 RX ORDER — PROPRANOLOL HYDROCHLORIDE 10 MG/1
10 TABLET ORAL 4 TIMES DAILY
Qty: 360 TABLET | Refills: 3 | Status: SHIPPED | OUTPATIENT
Start: 2018-06-26

## 2018-07-17 ENCOUNTER — TRANSFERRED RECORDS (OUTPATIENT)
Dept: HEALTH INFORMATION MANAGEMENT | Facility: CLINIC | Age: 28
End: 2018-07-17

## 2018-09-06 ENCOUNTER — TRANSFERRED RECORDS (OUTPATIENT)
Dept: HEALTH INFORMATION MANAGEMENT | Facility: CLINIC | Age: 28
End: 2018-09-06

## 2020-03-11 ENCOUNTER — HEALTH MAINTENANCE LETTER (OUTPATIENT)
Age: 30
End: 2020-03-11
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
## 2021-01-03 ENCOUNTER — HEALTH MAINTENANCE LETTER (OUTPATIENT)
Age: 31
End: 2021-01-03

## 2021-04-25 ENCOUNTER — HEALTH MAINTENANCE LETTER (OUTPATIENT)
Age: 31
End: 2021-04-25

## 2021-10-10 ENCOUNTER — HEALTH MAINTENANCE LETTER (OUTPATIENT)
Age: 31
End: 2021-10-10

## 2022-05-21 ENCOUNTER — HEALTH MAINTENANCE LETTER (OUTPATIENT)
Age: 32
End: 2022-05-21

## 2022-09-18 ENCOUNTER — HEALTH MAINTENANCE LETTER (OUTPATIENT)
Age: 32
End: 2022-09-18

## 2023-06-04 ENCOUNTER — HEALTH MAINTENANCE LETTER (OUTPATIENT)
Age: 33
End: 2023-06-04